# Patient Record
Sex: MALE | Race: AMERICAN INDIAN OR ALASKA NATIVE
[De-identification: names, ages, dates, MRNs, and addresses within clinical notes are randomized per-mention and may not be internally consistent; named-entity substitution may affect disease eponyms.]

---

## 2022-07-05 ENCOUNTER — HOSPITAL ENCOUNTER (EMERGENCY)
Dept: HOSPITAL 5 - ED | Age: 77
Discharge: HOME | End: 2022-07-05
Payer: COMMERCIAL

## 2022-07-05 VITALS — SYSTOLIC BLOOD PRESSURE: 147 MMHG | DIASTOLIC BLOOD PRESSURE: 79 MMHG
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
## 2022-07-05 DIAGNOSIS — S93.402A: Primary | ICD-10-CM
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
## 2022-07-05 PROCEDURE — 99283 EMERGENCY DEPT VISIT LOW MDM: CPT

## 2022-07-05 NOTE — EMERGENCY DEPARTMENT REPORT
ED Lower Extremity HPI





- General


Chief Complaint: Extremity Injury, Lower


Stated Complaint: LT ANKLE INJURY/ KNEE SWOLLEN


Time Seen by Provider: 07/05/22 20:43


Source: patient


Mode of arrival: Ambulatory


Limitations: No Limitations





- History of Present Illness


Initial Comments: 


Patient 77-year-old male who presents for left lateral ankle pain and swelling 

x3 days.  Patient states he twisted his ankle stepping off a curb 2 days ago.  

Now with pain and swelling the same.  There is no deformity no numbness or 

tingling or paralysis.  Patient is partial weightbearing.  There is noted 

moderate swelling mild erythema.  No ecchymosis.  Pain is rated at 5/10.  Pain 

is exacerbated by weightbearing.  Pain is relieved by offloading and rest.  

Patient has had ankle fracture in the past and arthritis multiple joint.





MD Complaint: ankle injury





- Related Data


                                  Previous Rx's











 Medication  Instructions  Recorded  Last Taken  Type


 


Acetaminophen [Tylenol Extra 1,000 mg PO Q6H PRN #30 tab 07/05/22 Unknown Rx





Strength]    











                                    Allergies











Allergy/AdvReac Type Severity Reaction Status Date / Time


 


No Known Allergies Allergy   Verified 07/05/22 14:37














ED Review of Systems


ROS: 


Stated complaint: LT ANKLE INJURY/ KNEE SWOLLEN


Other details as noted in HPI





Constitutional: denies: chills, fever


Eyes: denies: eye pain, eye discharge, vision change


ENT: denies: ear pain, throat pain


Respiratory: denies: cough, shortness of breath, wheezing


Cardiovascular: denies: chest pain, palpitations


Endocrine: no symptoms reported


Gastrointestinal: denies: abdominal pain, nausea, vomiting, diarrhea


Genitourinary: denies: urgency, dysuria


Musculoskeletal: joint swelling, other (Left ankle pain and swelling).  denies: 

back pain, arthralgia


Skin: denies: rash, lesions


Neurological: denies: headache, weakness, paresthesias


Psychiatric: denies: anxiety, depression


Hematological/Lymphatic: denies: easy bleeding, easy bruising





ED Past Medical Hx





- Past Medical History


Previous Medical History?: No





- Surgical History


Past Surgical History?: No





- Medications


Home Medications: 


                                Home Medications











 Medication  Instructions  Recorded  Confirmed  Last Taken  Type


 


Acetaminophen [Tylenol Extra 1,000 mg PO Q6H PRN #30 tab 07/05/22  Unknown Rx





Strength]     














ED Physical Exam





- General


Limitations: No Limitations


General appearance: alert, in no apparent distress





- Head


Head exam: Present: normocephalic, normal inspection





- Eye


Eye exam: Present: EOMI


Pupils: Present: normal accommodation





- ENT


ENT exam: Present: mucous membranes moist





- Neck


Neck exam: Present: normal inspection, full ROM.  Absent: tenderness, 

lymphadenopathy





- Respiratory


Respiratory exam: Present: normal lung sounds bilaterally.  Absent: respiratory 

distress, wheezes





- Cardiovascular


Cardiovascular Exam: Present: regular rate, normal rhythm, normal heart sounds. 

Absent: systolic murmur, diastolic murmur, rubs, gallop





- GI/Abdominal


GI/Abdominal exam: Present: soft, normal bowel sounds.  Absent: distended, 

tenderness





- Rectal


Rectal exam: Present: deferred





- Extremities Exam


Extremities exam: Present: normal inspection, normal capillary refill





- Expanded Lower Extremity Exam


  ** Left


Ankle exam: Present: full ROM, tenderness (Left lateral tenderness negative 

Phipps sign distal pulses intact +2 RT less than 3 seconds.  Mild pain with 

rotation), swelling.  Absent: abrasion, laceration, ecchymosis, deformity, 

crepidus, dislocation, erythema, anterior draw sign


Foot/Toe exam: Present: full ROM.  Absent: tenderness, swelling


Neuro vascular tendon exam: Absent: pulse deficit, motor deficit, sensory 

deficit, tendon deficit


Gait: Positive: observed and limited by pain





- Back Exam


Back exam: Present: normal inspection, full ROM.  Absent: paraspinal tenderness,

vertebral tenderness





- Neurological Exam


Neurological exam: Present: alert, oriented X3, CN II-XII intact, reflexes 

normal.  Absent: motor sensory deficit





- Expanded Neurological Exam


  ** Expanded


Patient oriented to: Present: person, place, time


Speech: Present: fluid speech


Motor strength exam: RUE: 5, LUE: 5, RLE: 5, LLE: 5


DTR: ankle (R): 1+, ankle (L): 1+


Best Eye Response (Dunmore): (4) open spontaneously


Best Motor Response (Dunmore): (6) obeys commands


Best Verbal Response (Sonali): (5) oriented


Sonali Total: 15





- Psychiatric


Psychiatric exam: Present: normal affect, normal mood





- Skin


Skin exam: Present: warm, dry, intact, normal color.  Absent: rash





ED Course





                                   Vital Signs











  07/05/22





  14:34


 


Temperature 97.9 F


 


Pulse Rate 78


 


Respiratory 18





Rate 


 


Blood Pressure 147/79





[Right] 


 


O2 Sat by Pulse 100





Oximetry 














ED Lower Extremity MDM





- Radiology Data


Radiology results: report reviewed, image reviewed





- Medical Decision Making


LEFT ANKLE 3 VIEWS  


 


 INDICATION / CLINICAL INFORMATION: Injury with twisted ankle, pain and 

swelling.  


 


 COMPARISON: None available.  


 


 FINDINGS:  


 


 BONES / JOINT(S): There is a small ossific or calcific density lateral to the 

distal fibula, 


probably related to old trauma or dystrophic calcification, rather than acute 

avulsion fracture. No 


dislocation. There are mild degenerative changes involving the dorsum of the 

midfoot. 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  


 SOFT TISSUES: There is mild soft tissue swelling, more prominent overlying the 

lateral malleolus.  


 


 ADDITIONAL FINDINGS: None.  


 


 Signer Name: Pro Christine MD   


 Signed: 7/5/2022 3:31 PM  


 Workstation Name: [id number]   


 


 


Transcribed By: RT  


Dictated By: Pro Christine MD  


Electronically Authenticated By: Pro Christine MD    


Signed Date/Time: 07/05/22 1531                                


 


 


 


DD/DT: 07/05/22 1527                                                            

  


TD/TT:








Critical care attestation.: 


If time is entered above; I have spent that time in minutes in the direct care 

of this critically ill patient, excluding procedure time.








ED Disposition


Clinical Impression: 


Left ankle sprain


Qualifiers:


 Encounter type: initial encounter Involved ligament of ankle: unspecified 

ligament Qualified Code(s): S93.402A - Sprain of unspecified ligament of left 

ankle, initial encounter





Disposition: 01 HOME / SELF CARE / HOMELESS


Is pt being admited?: No


Does the pt Need Aspirin: No


Condition: Stable


Instructions:  Ankle Sprain, Phase I Rehab-[facility]


Additional Instructions: 


Take medication as prescribed, use crutches and Ace wrap as directed, follow-up 

with orthopedics in 2 to 3 days.  Return to emergency department should symptoms

 worsen


Prescriptions: 


Acetaminophen [Tylenol Extra Strength] 1,000 mg PO Q6H PRN #30 tab


 PRN Reason: Pain


Referrals: 


PRO SPAIN MD [Staff Physician] - 3-5 Days


Forms:  Work/School Release Form(ED)


Time of Disposition: 21:46

## 2022-07-05 NOTE — XRAY REPORT
LEFT ANKLE 3 VIEWS



INDICATION / CLINICAL INFORMATION: Injury with twisted ankle, pain and swelling.



COMPARISON: None available.

 

FINDINGS:



BONES / JOINT(S): There is a small ossific or calcific density lateral to the distal fibula, probably
 related to old trauma or dystrophic calcification, rather than acute avulsion fracture. No dislocati
on. There are mild degenerative changes involving the dorsum of the midfoot. There is a prominent philomena
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

SOFT TISSUES: There is mild soft tissue swelling, more prominent overlying the lateral malleolus.



ADDITIONAL FINDINGS: None.



Signer Name: Pro Christine MD 

Signed: 7/5/2022 3:31 PM

Workstation Name: [email or web address]